# Patient Record
Sex: FEMALE | Race: ASIAN | NOT HISPANIC OR LATINO | URBAN - METROPOLITAN AREA
[De-identification: names, ages, dates, MRNs, and addresses within clinical notes are randomized per-mention and may not be internally consistent; named-entity substitution may affect disease eponyms.]

---

## 2022-03-15 ENCOUNTER — EMERGENCY (EMERGENCY)
Facility: HOSPITAL | Age: 37
LOS: 1 days | Discharge: HOME | End: 2022-03-15
Attending: EMERGENCY MEDICINE | Admitting: EMERGENCY MEDICINE
Payer: COMMERCIAL

## 2022-03-15 VITALS
DIASTOLIC BLOOD PRESSURE: 65 MMHG | TEMPERATURE: 98 F | SYSTOLIC BLOOD PRESSURE: 99 MMHG | RESPIRATION RATE: 16 BRPM | HEART RATE: 61 BPM | OXYGEN SATURATION: 98 %

## 2022-03-15 VITALS
DIASTOLIC BLOOD PRESSURE: 73 MMHG | HEIGHT: 69 IN | RESPIRATION RATE: 16 BRPM | TEMPERATURE: 98 F | OXYGEN SATURATION: 100 % | HEART RATE: 65 BPM | SYSTOLIC BLOOD PRESSURE: 107 MMHG | WEIGHT: 126.99 LBS

## 2022-03-15 PROCEDURE — 99283 EMERGENCY DEPT VISIT LOW MDM: CPT | Mod: 25

## 2022-03-15 PROCEDURE — 73140 X-RAY EXAM OF FINGER(S): CPT | Mod: 26,RT

## 2022-03-15 NOTE — ED ADULT TRIAGE NOTE - CHIEF COMPLAINT QUOTE
sent from urgent care for eval of right hand, 5th digit laceration, accidentally cut with a piece of porcelain

## 2022-03-15 NOTE — ED PROVIDER NOTE - PATIENT PORTAL LINK FT
You can access the FollowMyHealth Patient Portal offered by [facility] by registering at the following website: [email or web address]. By joining [facility] FollowMyHealth portal, you will also be able to view your health information using other applications (apps) compatible with our system.

## 2022-03-15 NOTE — ED PROVIDER NOTE - NSFOLLOWUPINSTRUCTIONS_ED_ALL_ED_FT
Laceration    Keep dry and covered for 24 hours  Please follow up with Dr. Winters in the office in a week as planned.     A laceration is a cut that goes through all of the layers of the skin and into the tissue that is right under the skin. Some lacerations heal on their own. Others need to be closed with skin adhesive strips, skin glue, stitches (sutures), or staples. Proper laceration care minimizes the risk of infection and helps the laceration to heal better.  If non-absorbable stitches or staples have been placed, they must be taken out within the time frame instructed by your healthcare provider.    SEEK IMMEDIATE MEDICAL CARE IF YOU HAVE ANY OF THE FOLLOWING SYMPTOMS: swelling around the wound, worsening pain, drainage from the wound, red streaking going away from your wound, inability to move finger or toe near the laceration, or discoloration of skin near the laceration.

## 2022-03-15 NOTE — ED PROVIDER NOTE - SKIN, MLM
Lac to mid R volar 5th digit, digit was already blocked by UCC, tendons appear intact, Lac ~ 1.3 cm.

## 2022-03-15 NOTE — ED PROVIDER NOTE - OBJECTIVE STATEMENT
36 F sent from [facility] for hand Md haas- she cut her R5th digit with some broken porcelain. Apparently has a bone avulsion and they were concerned for flexor tendon injury. Jonathan PULLIAM.

## 2022-03-15 NOTE — ED PROVIDER NOTE - CLINICAL SUMMARY MEDICAL DECISION MAKING FREE TEXT BOX
Patient presenting with finger lac- xrays showed no fx. Seen by hand who did not detect a tenon injury. Lac closed by Dr. Winters.

## 2022-03-18 DIAGNOSIS — S61.216A LACERATION WITHOUT FOREIGN BODY OF RIGHT LITTLE FINGER WITHOUT DAMAGE TO NAIL, INITIAL ENCOUNTER: ICD-10-CM
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
## 2024-10-02 NOTE — ED ADULT NURSE REASSESSMENT NOTE - NS ED NURSE REASSESS COMMENT FT1
Pt received from [facility]. Pending x ray read. Patient provided for rounding. Patient in no apparent distress. Resting comfortably. Patient provided for emotional support, comfort, safety, and review of plan of care. Will continue to monitor. The writer attempted to contact the patient to verify the need for services. The writer LVM for the patient to contact the office for assistance with being placed on the proper wait list.     1st attempt

## 2025-01-15 NOTE — ED ADULT NURSE NOTE - NSFALLRSKUNASSIST_ED_ALL_ED
Demographics  Ana Mittal  female   1969  [id number]     Chief Complaint   Cough (Had a virtual visit and the said I needed to go to urgent care for an x-ray to check for pneumonia. - Entered by patient)      Vitals:    01/15/25 1143 01/15/25 1319   BP: (!) 140/82 132/74   BP Location: LFA - Left forearm LUE - Left upper extremity   Patient Position: Sitting Sitting   Cuff Size: Regular Large Adult   Pulse: 98 82   Resp: (!) 24 18   Temp: 98.4 °F (36.9 °C)    TempSrc: Oral    SpO2: 98% 97%   LMP: 05/17/2018         History of Present Illness / ROS  The patient presents for evaluation of a persistent cough, rhinorrhea, sore throat, earache, low-grade fever, dyspnea, and chest pain. PMH significant for HTN, HLD, DM2, obesity, hypothyroidism.     She has been experiencing these symptoms for over a week.  sick with sinus infection. She has been experiencing a persistent cough, rhinorrhea, sore throat, earache, low-grade fever up to 100.9, dyspnea, and chest discomfort. Her dyspnea is exacerbated by her cough. But is always present. She also reports constant chest pain, which she finds difficult to distinguish from her usual fibromyalgia and Dercum's disease-related discomfort. Despite attempts to alleviate her symptoms with DayQuil, NyQuil, ibuprofen, and cough drops, she has not found significant relief. She was raised in a smoking environment but does not smoke herself. No history of asthma. She has an albuterol inhaler but does not use it much. She also reports mild nausea and decreased appetite and fluid intake.    Supplemental Information  She has a history of an abnormal stress test, for which she underwent catheterization. She was informed of a valve issue, but it was not deemed serious.    SOCIAL HISTORY  She does not smoke.    MEDICATIONS  Current: DayQuil, NyQuil, ibuprofen, cough drops    PAST MEDICAL, FAMILY AND SOCIAL HISTORY   Problem List:  Patient Active Problem List   Diagnosis    Morbid  obesity with BMI of 45.0-49.9, adult (CMS/formerly [facility])    Acquired hypothyroidism    Dyspareunia, female    urinary stress incontinence - 1/9/2012 patient counseled ;armando     Irritable bowel syndrome    Episode of recurrent major depressive disorder (CMD)    AJ on CPAP    Chronic bilateral low back pain without sciatica    Type 2 diabetes mellitus with hyperglycemia, with long-term current use of insulin (CMD)    Fibromyalgia    Mixed hyperlipidemia    Chronic tension-type headache, not intractable    Chronic bilateral low back pain with right-sided sciatica    Cervicalgia    Adiposis dolorosa    Essential hypertension    Microalbuminuria    Precordial pain    Abnormal nuclear stress test    PTSD (post-traumatic stress disorder)    BPPV (benign paroxysmal positional vertigo), right    Myalgia    Acute pain of left shoulder    Physical deconditioning    Diabetic gastroparesis  (CMD)    Hot flashes, menopausal       Medical History:  Past Medical History:   Diagnosis Date    8/1/2011 - menometrorrhagia ;armando 08/01/2011 8/1/2011 bilateral ovarian cysts - likely simple but seen only on transabdominal ultrasound at [facility] ;armando 08/01/2011    Abnormal Pap smear of cervix 2008    nl since    Adiposis dolorosa     aka Dercum disease    Anxiety     Arthritis     Back & R knee    Cataracts, bilateral     Colon polyp 10/19/2022    Dr. Pearson, Tubular adenoma, recall 5 years    Depression     Dyspareunia 08/01/2011    Failed moderate sedation during procedure     slow to wake due to sleep apnea history    Fibromyalgia     Gastroparesis due to secondary diabetes (CMD) 05/24/2019    proven by gastric emptying study     High triglycerides     Hypertension     IBS (irritable bowel syndrome)     Internal hemorrhoids 10/19/2022    Dr. Pearson    Lung infection     Chronic - during winter    OBESE and Chronic estrous state - patient counseled ;armando 08/01/2011    Ocular hypertension     AJ on CPAP     Osteoarthritis     Reactive airway disease (CMD)      SOB (shortness of breath)     Squamous cell carcinoma     Right Forearm     Status post dilation of esophageal narrowing 10/19/2022    Dr. Pearson, 20 mm    Thyroid disease     Hypothyroidism    Type II diabetes mellitus  (CMD) 02/13/2014    Checks blood sugar at home TID (Dr. Parvez Tapia)    Urinary incontinence     Urinary urgency     Use of cane as ambulatory aid     Wears reading eyeglasses        Medications:  Current Outpatient Medications   Medication Sig Dispense Refill    metFORMIN (GLUCOPHAGE-XR) 500 MG 24 hr tablet TAKE 2 TABLETS BY MOUTH IN THE MORNING AND IN THE EVENING WITH MEALS 120 tablet 0    estradiol (CLIMARA) 0.025 MG/24HR once weekly patch Place 1 patch onto the skin 1 day a week. 12 each 3    Progesterone 100 MG Cap Take 1 capsule by mouth nightly. 90 capsule 3    prazosin (MINIPRESS) 2 MG capsule TAKE 2 (TWO) CAPSULES BY MOUTH DAILY AT BEDTIME 180 capsule 1    PARoxetine (PAXIL) 20 MG tablet Take 1 tablet by mouth daily. 90 tablet 1    busPIRone (BUSPAR) 30 MG tablet Take 1 tablet by mouth in the morning and 1 tablet in the evening. 180 tablet 0    buPROPion XL (WELLBUTRIN XL) 150 MG 24 hr tablet Take 1 tablet by mouth daily. 90 tablet 1    lisinopril (ZESTRIL) 10 MG tablet TAKE 1 TABLET BY MOUTH EVERY DAY 90 tablet 0    naltrexone 3 mg capsule Take 1 capsule by mouth daily. 90 g 1    mupirocin (BACTROBAN) 2 % ointment Apply thin film to affected area 3 times daily 22 g 0    pravastatin (PRAVACHOL) 40 MG tablet TAKE 1/2 TABLET BY MOUTH DAILY 45 tablet 3    carvedilol (COREG) 6.25 MG tablet TAKE 1 TABLET BY MOUTH TWICE A DAY WITH MEALS 180 tablet 3    insulin regular human, CONCENTRATED, (HumuLIN R U-500 KwikPen) 500 UNIT/ML pen-injector CHANGE U500  UNITS BEFORE BREAKFAST  UNITS BEFORE DINNER WITH CORRECTION SCALE ADD 5: IF LEVEL 50>150, SUBTRACT 20 UNITS IF MORE THAN 100 . IF SKIPPING A MEAL, INJECT 50 UNITS IN THE MORNING AND 45 UNITS AT NIGHT WITH CORRECTION SCALE +5 UNITS  :50>150, SUBTRACT 10 IF LESS THAN 100. MAX. DAILY DOSE  UNTIS 60 each 1    Icosapent Ethyl (Vascepa) 1 g Cap Take 2 g by mouth in the morning and 2 g in the evening. 360 capsule 3    triamcinolone (ARISTOCORT) 0.1 % ointment Apply 1 application. topically in the morning and 1 application. in the evening. To right lower leg. 30 g 0    tiZANidine (ZANAFLEX) 4 MG tablet TAKE 0.5-1.5 TABLETS BY MOUTH EVERY 8 HOURS AS NEEDED. DO NOT TAKE EVERY DAY. MAX 3X PER WEEK 30 tablet 0    clindamycin (CLINDAGEL) 1 % gel Apply 1 application. topically in the morning and 1 application. in the evening. Apply thin film to affected skin twice daily 60 g 1    Cholecalciferol (Vitamin D3) 50 mcg (2,000 units) capsule Take 1 capsule by mouth daily. 90 capsule 3    fluticasone (FLONASE) 50 MCG/ACT nasal spray Spray 2 sprays in each nostril daily. 16 g 0    albuterol 108 (90 Base) MCG/ACT inhaler Inhale 2 puffs into the lungs every 4 hours as needed for Shortness of Breath or Wheezing. 1 each 1    pantoprazole (PROTONIX) 20 MG tablet TAKE 1 TABLET BY MOUTH EVERY DAY BEFORE BREAKFAST 90 tablet 0    hyoscyamine (LEVSIN) 0.125 MG tablet Take 1 tablet by mouth every 4 hours as needed for Cramping. 30 tablet 4    Continuous Blood Gluc Sensor (Dexcom G6 Sensor) Misc Insert new sensor every 10 days. 1 each 0    Continuous Blood Gluc Transmit (Dexcom G6 Transmitter) Misc 1 each every 3 months. Use as directed for continuous glucose monitoring. Change every 3 months 1 each 3    Insulin Pen Needle (B-D U/F PEN NEEDLE) 31G X 5 MM Misc USE FOUR TIMES DAILY TO INJECT INSULIN 400 each 3    aspirin 81 MG tablet Take 81 mg by mouth daily.      Lancets (FREESTYLE) Misc TO CHECK BLOOD GLUCOSE THREE TIMES DAILY BEFORE A MEAL AND AT BEDTIME 100 each 11    loratadine (CLARITIN) 10 MG tablet Take 10 mg by mouth daily.      blood glucose test strip To check BG TID AC. E11.65 DM2. 100 strip 11    Multiple Vitamins-Minerals (MULTIVITAMIN PO) Take 1 capsule by  mouth 2 times daily.      NON FORMULARY Smooth gator cream.   Use daily as needed      NON FORMULARY Restless legs cream   Use daily as needed.      Coenzyme Q10 (COQ10) 100 MG Cap Take 100 mg by mouth every morning. One tablet daily 30 capsule     nitroGLYcerin (NITROSTAT) 0.4 MG SL tablet Place 0.4 mg under the tongue every 5 minutes as needed.      Elastic Bandages & Supports (MEDICAL COMPRESSION STOCKINGS) MISC 1 Units daily. Please measure for 1 pair of compression stocking to fit left leg-20 to 30 mm Hg. 1 each 0     No current facility-administered medications for this visit.       Allergies:  Allergies   Allergen Reactions    Cymbalta [Duloxetine Hcl] Other (See Comments)     Electrical shocks to back of head, per pt.    Lyrica DEPRESSION     suicidal thoughts.    Emollient RASH     Soap, Zest soap    Gabapentin Other (See Comments)     Patient believes it made her feel suicidal and worsened her depression.    Lantus RASH, DIARRHEA and PRURITUS     Per Patient \"I've had problems with this medication in the past. I have gotten diarrhea, burning, rash at the site and itching all over\".    Nylon RASH    Ortho Tri-Cyclen [Norgestimate-Eth Estradiol] RASH     visual change , but tolerated progesterone without reaction ( reviewed again with patient )- Bartolome Aguilar        Physical Exam    Physical Exam  Constitutional:       General: She is in acute distress.      Appearance: Normal appearance. She is obese. She is ill-appearing. She is not toxic-appearing.   HENT:      Head: Normocephalic and atraumatic.      Right Ear: Tympanic membrane, ear canal and external ear normal.      Left Ear: Tympanic membrane, ear canal and external ear normal.      Nose: Nose normal. No congestion or rhinorrhea.      Mouth/Throat:      Mouth: Mucous membranes are moist.      Pharynx: No oropharyngeal exudate or posterior oropharyngeal erythema.   Eyes:      Extraocular Movements: Extraocular movements intact.       Conjunctiva/sclera: Conjunctivae normal.      Pupils: Pupils are equal, round, and reactive to light.   Cardiovascular:      Rate and Rhythm: Normal rate and regular rhythm.      Pulses: Normal pulses.      Heart sounds: Normal heart sounds.   Pulmonary:      Effort: Pulmonary effort is normal. Tachypnea present.      Breath sounds: Wheezing present. No rales.   Lymphadenopathy:      Cervical: No cervical adenopathy.   Skin:     General: Skin is warm and dry.      Capillary Refill: Capillary refill takes less than 2 seconds.   Neurological:      Mental Status: She is alert and oriented to person, place, and time.   Psychiatric:         Mood and Affect: Mood normal.         Behavior: Behavior normal.     Imaging:  XR CHEST PA AND LATERAL 2 VIEWS    Result Date: 1/15/2025  EXAM:  XR CHEST PA AND LATERAL 2 VIEWS DATE OF EXAM:  1/15/2025 12:38 PM. HISTORY: R68.89 Flu-like symptoms cough, fever. COMPARISON: 2022. FINDINGS: PA and lateral chest. The heart size is normal. The lungs are grossly clear. I do not see an infiltrate. There is mild wedging of a lower thoracic vertebral bodies. Anterior wedging has increased somewhat relative to 2022.     IMPRESSION: I do not see any acute changes in the chest. Electronically Signed by: Nirmal Johnson MD Signed on: 1/15/2025 1:01 PM Created on Workstation ID: [id number] Signed on Workstation ID: [id number]      ECG: Sinus rhythm with occasional premature ventricular complexes. No ST or T wave changes. Unchanged from previous 6/2024.     Assessment and Plan / Medical Decision Making    (R68.89) Flu-like symptoms  (primary encounter diagnosis)  Plan: SARS-COV-2/INFLUENZA BY PCR, Streptococcus         group A PCR, XR CHEST PA AND LATERAL 2 VIEWS    (R07.9) Chest pain, unspecified type  Plan: Electrocardiogram 12-Lead    (R06.02) Shortness of breath  Plan: ipratropium-albuterol (DUONEB) 0.5-2.5 (3)         MG/3ML nebulizer solution 3 mL      1. Chest pain and shortness of breath, viral  syndrome.   Vital signs reviewed. Initially tachypnic at 24. Is afebrile and normotensive. Pt here today for a week of symptoms including cough, rhinorrhea, sore throat, ear pain, fevers. She also notes chest pain, unsure if changed from baseline, and significant shortness of breath. On exam she is somewhat ill appearing, with labored breathing. Lungs reveal diffuse wheezing. HR did sound irregular, an ECG was ordered which revealed sinus rhythm with occasional PVCs. Unchanged from previous. Covid, flu, and strep negative. CXR revealed no acute findings or pneumonia. After duoneb, patient feels like things somewhat loosened, however still significantly short of breath and has chest pain.     Given her symptoms and medical history, advised she go to ER for further evaluation, workup, and monitoring, while this may be due to viral infection, needs more thorough workup to exclude cardiac cause of her symptoms. An ambulance transfer is recommended, however patient declined. AMA form signed.  plans to drive her immediately to [facility].    I discussed with patient my exam findings, test results, and medical decision-making. she is in agreement with the plan of care as stated above.  Patient is aware of over-the-counter medications they can take for symptom management.  Patient is also aware of signs and symptoms of when to return to urgent care; and signs and symptoms of when to proceed to the emergency department.  Patient has no questions at this time    Discussed with Dr. Cohen who is in agreement with plan of care     Procedures      BEKA Jones  1/15/2025                   no